# Patient Record
Sex: FEMALE | Race: BLACK OR AFRICAN AMERICAN | NOT HISPANIC OR LATINO | URBAN - METROPOLITAN AREA
[De-identification: names, ages, dates, MRNs, and addresses within clinical notes are randomized per-mention and may not be internally consistent; named-entity substitution may affect disease eponyms.]

---

## 2017-10-13 ENCOUNTER — EMERGENCY (EMERGENCY)
Facility: HOSPITAL | Age: 37
LOS: 1 days | Discharge: HOME | End: 2017-10-13
Attending: EMERGENCY MEDICINE | Admitting: EMERGENCY MEDICINE
Payer: SELF-PAY

## 2017-10-13 VITALS
OXYGEN SATURATION: 100 % | TEMPERATURE: 99 F | RESPIRATION RATE: 18 BRPM | DIASTOLIC BLOOD PRESSURE: 85 MMHG | SYSTOLIC BLOOD PRESSURE: 114 MMHG | HEART RATE: 98 BPM

## 2017-10-13 DIAGNOSIS — H60.502 UNSPECIFIED ACUTE NONINFECTIVE OTITIS EXTERNA, LEFT EAR: ICD-10-CM

## 2017-10-13 DIAGNOSIS — Z91.040 LATEX ALLERGY STATUS: ICD-10-CM

## 2017-10-13 DIAGNOSIS — H92.02 OTALGIA, LEFT EAR: ICD-10-CM

## 2017-10-13 PROCEDURE — 99284 EMERGENCY DEPT VISIT MOD MDM: CPT

## 2017-10-13 RX ORDER — NEOMYCIN/POLYMYXIN B/HYDROCORT
2 SUSPENSION, DROPS(FINAL DOSAGE FORM)(ML) OTIC (EAR)
Qty: 100 | Refills: 0 | OUTPATIENT
Start: 2017-10-13 | End: 2017-10-20

## 2017-10-13 RX ORDER — OXYCODONE AND ACETAMINOPHEN 5; 325 MG/1; MG/1
1 TABLET ORAL ONCE
Qty: 0 | Refills: 0 | Status: DISCONTINUED | OUTPATIENT
Start: 2017-10-13 | End: 2017-10-13

## 2017-10-13 RX ADMIN — OXYCODONE AND ACETAMINOPHEN 1 TABLET(S): 5; 325 TABLET ORAL at 17:10

## 2017-10-13 NOTE — ED PROVIDER NOTE - MEDICAL DECISION MAKING DETAILS
36 year old patient, recently traveled from [place] with 3 days of worsening left ear pain. Had spent time swimming. Examined history consistent with acute otitis externa. Topical cortisporin and ENT follow-up.

## 2017-10-13 NOTE — ED PROVIDER NOTE - OBJECTIVE STATEMENT
36y F presents to ED complaining of left ear and jaw pain. Pt states the pain began 3 days ago, when she was at airport. Pt states she had been in [place] swimming frequently beforehand. Pt states that she put in drugstore solution for ear relief, but pain worsened upon application. Pt notes that it has been difficult to eat since pain began. Pt denies cough, runny nose. Pt also denies scuba diving during her trip.

## 2017-10-13 NOTE — ED PROVIDER NOTE - ENMT, MLM
Minimal tenderness with palpation of tragus and traction of pinna. External canal debris and edema on direct inspection. Right ear appears normal.

## 2018-09-21 ENCOUNTER — EMERGENCY (EMERGENCY)
Facility: HOSPITAL | Age: 38
LOS: 1 days | Discharge: HOME | End: 2018-09-21
Admitting: EMERGENCY MEDICINE
Payer: SELF-PAY

## 2018-09-21 VITALS
RESPIRATION RATE: 16 BRPM | OXYGEN SATURATION: 98 % | TEMPERATURE: 99 F | SYSTOLIC BLOOD PRESSURE: 114 MMHG | DIASTOLIC BLOOD PRESSURE: 72 MMHG | HEART RATE: 66 BPM

## 2018-09-21 VITALS
DIASTOLIC BLOOD PRESSURE: 75 MMHG | OXYGEN SATURATION: 98 % | RESPIRATION RATE: 16 BRPM | SYSTOLIC BLOOD PRESSURE: 114 MMHG | TEMPERATURE: 99 F | HEART RATE: 62 BPM

## 2018-09-21 DIAGNOSIS — Z91.040 LATEX ALLERGY STATUS: ICD-10-CM

## 2018-09-21 DIAGNOSIS — Z79.899 OTHER LONG TERM (CURRENT) DRUG THERAPY: ICD-10-CM

## 2018-09-21 DIAGNOSIS — N75.1 ABSCESS OF BARTHOLIN'S GLAND: ICD-10-CM

## 2018-09-21 PROCEDURE — 56420 I&D BARTHOLINS GLAND ABSCESS: CPT

## 2018-09-21 PROCEDURE — 99282 EMERGENCY DEPT VISIT SF MDM: CPT | Mod: 25

## 2018-09-21 NOTE — ED ADULT TRIAGE NOTE - CHIEF COMPLAINT QUOTE
Patient complaining of abscess to labia of vagina Patient complaining of abscess to labia of vagina x 3 days

## 2018-09-21 NOTE — ED ADULT NURSE NOTE - NSIMPLEMENTINTERV_GEN_ALL_ED
Implemented All Universal Safety Interventions:  [place] to call system. Call bell, personal items and telephone within reach. Instruct patient to call for assistance. Room bathroom lighting operational. Non-slip footwear when patient is off stretcher. Physically safe environment: no spills, clutter or unnecessary equipment. Stretcher in lowest position, wheels locked, appropriate side rails in place.

## 2018-09-21 NOTE — ED PROVIDER NOTE - OBJECTIVE STATEMENT
36 y/o F presents to the ED with 3 days of a painful abscess to the left labia of her vagina. She has been applying warm compresses which have resolved similar prior abscesses in the past, however this abscess appears to be getting larger and more painful as per the patient. She states she would like to have the abscess incised and drained. She otherwise denies having any other associated sx's or acute medical complaints at this time.    Denies fever, chills, dizziness, weakness, abdo pain, N/V/D, dysuria, hematuria, vaginal bleeding or discharge

## 2018-09-23 ENCOUNTER — EMERGENCY (EMERGENCY)
Facility: HOSPITAL | Age: 38
LOS: 1 days | Discharge: HOME | End: 2018-09-23
Attending: EMERGENCY MEDICINE | Admitting: EMERGENCY MEDICINE

## 2018-09-23 VITALS
DIASTOLIC BLOOD PRESSURE: 77 MMHG | SYSTOLIC BLOOD PRESSURE: 119 MMHG | OXYGEN SATURATION: 99 % | RESPIRATION RATE: 19 BRPM | HEART RATE: 66 BPM | TEMPERATURE: 98 F | HEIGHT: 65 IN | WEIGHT: 134.92 LBS

## 2018-09-23 NOTE — ED ADULT TRIAGE NOTE - CHIEF COMPLAINT QUOTE
Pt presents to ED for wound check- had I & D of Bartholin's Cyst with packing placed. Pt denies foul smelling d/c or F/C.

## 2018-09-23 NOTE — ED PROVIDER NOTE - SKIN, MLM
Chaperoned by Neeraj (PCT) 3cm long gauze removed. No surrounding edema and erythema. No purulent drainage. Wound healing well.

## 2018-09-23 NOTE — ED ADULT NURSE NOTE - NSIMPLEMENTINTERV_GEN_ALL_ED
Implemented All Universal Safety Interventions:  [place] to call system. Call bell, personal items and telephone within reach. Instruct patient to call for assistance. Room bathroom lighting operational. Non-slip footwear when patient is off stretcher. Physically safe environment: no spills, clutter or unnecessary equipment. Stretcher in lowest position, wheels locked, appropriate side rails in place.

## 2018-09-23 NOTE — ED PROVIDER NOTE - OBJECTIVE STATEMENT
38 y/o Female here for a wound check. Pt was seen here in this ED 2 days ago for an abscess to the left labia of her vagina that resulted an I&D with packing placed. She was instructed to come back to remove the packing. Denies fever and chills.

## 2018-09-27 DIAGNOSIS — Z79.899 OTHER LONG TERM (CURRENT) DRUG THERAPY: ICD-10-CM

## 2018-09-27 DIAGNOSIS — Z48.01 ENCOUNTER FOR CHANGE OR REMOVAL OF SURGICAL WOUND DRESSING: ICD-10-CM

## 2018-09-27 DIAGNOSIS — Z91.040 LATEX ALLERGY STATUS: ICD-10-CM

## 2022-04-06 NOTE — ED PROVIDER NOTE - MEDICAL DECISION MAKING DETAILS
Myself
I&D performed without complication. Pt tolerated procedure well. Pt is to return in 2 days for packing removal and reassessment.

## 2022-04-11 ENCOUNTER — RESULT REVIEW (OUTPATIENT)
Age: 42
End: 2022-04-11

## 2022-09-28 ENCOUNTER — RESULT REVIEW (OUTPATIENT)
Age: 42
End: 2022-09-28

## 2023-11-06 NOTE — ED PROVIDER NOTE - NS_ATTENDINGSCRIBE_ED_ALL_ED
Remedios Jacobsen is a 53 year old female presenting to the walk-in clinic today alone for cough, sinus drainage, sinus pressure mild sore throat (due to drainage) and bilateral ear pain starting 10 days ago. Pt was recently on an antibiotic starting on 10/13. Pt's cold symptoms reappeared 3 days after finishing antibiotics.    Treatment tried prior to visit: Antibiotic and Mucinex    Swabs/Specimens collected during rooming process:  None    Work, School or  note needed: No    New vs Established: Established    Patient would like communication of their results via:    [employer]   I personally performed the service described in the documentation recorded by the scribe in my presence, and it accurately and completely records my words and actions.